# Patient Record
Sex: MALE | Race: BLACK OR AFRICAN AMERICAN | NOT HISPANIC OR LATINO | Employment: UNEMPLOYED | URBAN - METROPOLITAN AREA
[De-identification: names, ages, dates, MRNs, and addresses within clinical notes are randomized per-mention and may not be internally consistent; named-entity substitution may affect disease eponyms.]

---

## 2017-09-26 PROBLEM — T14.8XXA WOUND INFECTION: Status: ACTIVE | Noted: 2017-09-26

## 2017-09-26 PROBLEM — L08.9 WOUND INFECTION: Status: ACTIVE | Noted: 2017-09-26

## 2018-01-15 PROBLEM — M25.531 WRIST PAIN, RIGHT: Status: ACTIVE | Noted: 2018-01-15

## 2020-01-01 ENCOUNTER — TELEPHONE (OUTPATIENT)
Dept: PULMONOLOGY | Facility: CLINIC | Age: 55
End: 2020-01-01

## 2020-01-01 DIAGNOSIS — R91.8 LUNG MASS: Primary | ICD-10-CM

## 2020-01-01 DIAGNOSIS — J18.9 PNEUMONIA OF RIGHT UPPER LOBE DUE TO INFECTIOUS ORGANISM: ICD-10-CM

## 2020-08-10 PROBLEM — E87.1 HYPONATREMIA: Status: ACTIVE | Noted: 2020-01-01

## 2020-08-11 PROBLEM — D49.1 PULMONARY NEOPLASM: Status: ACTIVE | Noted: 2020-01-01

## 2020-08-11 PROBLEM — R10.13 EPIGASTRIC PAIN: Status: ACTIVE | Noted: 2020-01-01

## 2020-08-11 PROBLEM — R91.8 LUNG MASS: Status: ACTIVE | Noted: 2020-01-01

## 2020-08-13 PROBLEM — R10.13 EPIGASTRIC PAIN: Status: RESOLVED | Noted: 2020-01-01 | Resolved: 2020-01-01

## 2020-08-14 PROBLEM — D75.838 REACTIVE THROMBOCYTOSIS: Status: ACTIVE | Noted: 2020-01-01

## 2020-08-14 PROBLEM — D50.9 IRON DEFICIENCY ANEMIA: Status: ACTIVE | Noted: 2020-01-01

## 2020-08-18 PROBLEM — J95.811 PNEUMOTHORAX AFTER BIOPSY: Status: ACTIVE | Noted: 2020-01-01

## 2020-08-28 PROBLEM — E87.6 HYPOKALEMIA: Status: ACTIVE | Noted: 2020-01-01

## 2020-08-28 PROBLEM — M79.89 SWELLING OF EXTREMITY: Status: ACTIVE | Noted: 2020-01-01

## 2020-08-31 NOTE — TELEPHONE ENCOUNTER
----- Message from Rylee Lawrence RN sent at 8/28/2020  2:09 PM CDT -----  Regarding: FW: EBUS needed  JUAN Mujica I am forwarding this to Pulmonary.    Thanks  Rylee  ----- Message -----  From: Sil Elam RN  Sent: 8/28/2020  10:01 AM CDT  To: [facility]  Subject: EBUS needed                                      I have a patient that was referred to [facility] for lung mass; however lung biopsy has been unsuccessful- non-diagnostic. Recommended patient have EBUS to determine malignancy, can you assist in getting patient set up with correct clinic?  I'm unsure of who/where I need to direct referral to.    Thanks Sil

## 2020-09-01 NOTE — TELEPHONE ENCOUNTER
----- Message from Cristina Susie sent at 9/1/2020  1:35 PM CDT -----  Contact: [place]   [phone]  Caller says  he cannot get a ride tomorrow.   Asking you to call him and help him reschedule to another date when he can get a ride.  (no access to EBUS ) schedule.

## 2020-09-17 PROBLEM — I21.9 HEART ATTACK: Status: ACTIVE | Noted: 2020-01-01

## 2020-09-17 PROBLEM — I46.9 CARDIAC ARREST: Status: ACTIVE | Noted: 2020-01-01

## 2020-09-18 PROBLEM — J96.00 ACUTE RESPIRATORY FAILURE: Status: ACTIVE | Noted: 2020-01-01

## 2020-09-19 PROBLEM — A31.9 MYCOBACTERIUM GORDONAE INFECTION: Status: ACTIVE | Noted: 2020-01-01

## 2020-09-21 PROBLEM — R63.8 ALTERATION IN NUTRITION: Status: ACTIVE | Noted: 2020-01-01
